# Patient Record
Sex: FEMALE | RURAL
[De-identification: names, ages, dates, MRNs, and addresses within clinical notes are randomized per-mention and may not be internally consistent; named-entity substitution may affect disease eponyms.]

---

## 2019-05-06 VITALS — HEIGHT: 65 IN

## 2019-05-16 ENCOUNTER — OFFICE VISIT (OUTPATIENT)
Dept: CARDIOLOGY | Facility: CLINIC | Age: 54
End: 2019-05-16

## 2019-05-16 VITALS
BODY MASS INDEX: 33.82 KG/M2 | HEART RATE: 60 BPM | HEIGHT: 65 IN | DIASTOLIC BLOOD PRESSURE: 70 MMHG | WEIGHT: 203 LBS | SYSTOLIC BLOOD PRESSURE: 144 MMHG

## 2019-05-16 DIAGNOSIS — I42.1 HYPERTROPHIC OBSTRUCTIVE CARDIOMYOPATHY (HOCM) (HCC): Primary | ICD-10-CM

## 2019-05-16 PROCEDURE — 93000 ELECTROCARDIOGRAM COMPLETE: CPT | Performed by: INTERNAL MEDICINE

## 2019-05-16 PROCEDURE — 99204 OFFICE O/P NEW MOD 45 MIN: CPT | Performed by: INTERNAL MEDICINE

## 2019-05-16 NOTE — PROGRESS NOTES
Subjective:     Encounter Date:05/16/2019      Patient ID: Alla Ladd is a 53 y.o. female.    Chief Complaint: HOC    History of Present Illness    Dear Dr. Colunga,     I had the pleasure of seeing your patient in cardiology evaluation today. As you well know, she is a franci 53-year-old woman who had an unspecified cardiac condition a few years ago. She suffers from hypertension. She has a heart murmur and has a lot of symptoms related to activity. She decided to get this checked out again.     I reviewed her studies from 2017. Essentially I think she suffers from hypertrophic obstructive cardiomyopathy. She has evidence of septal hypertrophy. She has a gradient that is around 62 mmHg mean and sometimes has a peak gradient of 101 mmHg.     She was treated with beta blocker.     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 She does a lot of activities. She runs around and carries heavy objects. She seems to be able to do all of her work activities without limitation on most days. Occasionally she might feel chest discomfort or dyspnea climbing up 1 flight of stairs. This comes and goes.     She works out with a . During most activities she can keep up, but occasionally if she goes too hard she feels badly but she recovers very quickly. She has never had sudden death or syncope. If anything, her symptoms seem to be improved over time.         Review of Systems   All other systems reviewed and are negative.    Family History   Problem Relation Age of Onset   • Hypertension Mother    • Diabetes Father    • Hypertension Father    • Stroke Father    • Pulmonary embolism Sister    • Cancer Paternal Aunt      Social History     Tobacco Use   • Smoking status: Never Smoker   Substance Use Topics   • Alcohol use: Yes   • Drug use: No         ECG 12 Lead  Date/Time: 5/16/2019 10:57 AM  Performed by: Hardy Villeda MD  Authorized by: Hardy Villeda MD   Previous ECG: no previous ECG  available  Rhythm: sinus rhythm  BPM: 60  Other findings: left ventricular hypertrophy               Objective:     Physical Exam   Constitutional: She is oriented to person, place, and time. She appears well-developed and well-nourished.   HENT:   Head: Normocephalic and atraumatic.   Neck: Normal range of motion. Neck supple.   Cardiovascular: Normal rate and regular rhythm.   Murmur heard.   Systolic murmur is present with a grade of 3/6. The intensity increases with valsalva.  Pulmonary/Chest: Effort normal and breath sounds normal.   Abdominal: Soft. Bowel sounds are normal.   Musculoskeletal: Normal range of motion.   Neurological: She is alert and oriented to person, place, and time.   Skin: Skin is warm and dry.   Psychiatric: She has a normal mood and affect. Her behavior is normal. Thought content normal.   Vitals reviewed.      Lab Review:       Assessment:          Diagnosis Plan   1. Hypertrophic obstructive cardiomyopathy (HOCM) (CMS/[facility])            Plan:       It was a pleasure to see your patient in cardiac evaluation today. She is a franci 53-year-old woman with symptoms of hypertrophic obstructive cardiomyopathy. Her gradient is variable depending on her filling states. I have urged her to stay well hydrated and to keep her heart rate down. She can exercise within symptom limitations. The same goes for work. I am pleased she is able to keep a job, but she has to be careful not to overexert when she is symptomatic. She understands that she should continue her beta blocker and can take additional if she is having a bad day. She should be seen on a yearly basis to reassess her degree of obstruction.

## 2020-09-30 PROBLEM — R94.31 ABNORMAL ECG: Status: ACTIVE | Noted: 2017-12-22

## 2020-09-30 PROBLEM — I10 HYPERTENSION: Status: ACTIVE | Noted: 2017-12-22

## 2020-09-30 PROBLEM — I42.2 HYPERTROPHIC CARDIOMYOPATHY (HCC): Status: ACTIVE | Noted: 2017-12-22
